# Patient Record
Sex: FEMALE | Race: BLACK OR AFRICAN AMERICAN
[De-identification: names, ages, dates, MRNs, and addresses within clinical notes are randomized per-mention and may not be internally consistent; named-entity substitution may affect disease eponyms.]

---

## 2019-06-19 ENCOUNTER — HOSPITAL ENCOUNTER (OUTPATIENT)
Dept: HOSPITAL 74 - JRADIR | Age: 59
Discharge: HOME | End: 2019-06-19
Attending: INTERNAL MEDICINE
Payer: COMMERCIAL

## 2019-06-19 DIAGNOSIS — E04.1: Primary | ICD-10-CM

## 2019-06-19 PROCEDURE — 0G9G3ZX DRAINAGE OF LEFT THYROID GLAND LOBE, PERCUTANEOUS APPROACH, DIAGNOSTIC: ICD-10-PCS | Performed by: RADIOLOGY

## 2019-06-20 NOTE — PATH
Cytology Non-Gynecological Report



Patient Name:  MINOO NICHOLS

Accession #:  

[facility]. Rec. #:  [id number]                                                        

   /Age/Gender:  1960 (Age: 58) / F

Account:  [id number]                                                          

             Location: RADIOLOGY INTER

Taken:  2019

Received:  2019

Reported:  2019

Physicians:  CHARAN Luo M.D.

  



Specimen(s) Received

 LEFT THYROID FNA 





Clinical History

Thyroid nodule







Final Diagnosis

THYROID, LEFT, FINE NEEDLE ASPIRATION:

SATISFACTORY FOR EVALUATION.

BETHESDA CLASS II: BENIGN.

CYTOLOGIC FINDINGS ARE CONSISTENT WITH A BENIGN FOLLICULAR NODULE. 

SMALL FOLLICULAR CELLS, RARE MACROPHAGES, AND COLLOID PRESENT.





***Electronically Signed***

Ally Fabian M.D.





Gross Description

Received are eight direct smears, four of which are air-dried and Diff-Quik

stained, and four of which are alcohol fixed and Pap stained.  Also received is

20 ml of bloody formalin from which one cellblock is prepared.